# Patient Record
Sex: FEMALE | Race: OTHER
[De-identification: names, ages, dates, MRNs, and addresses within clinical notes are randomized per-mention and may not be internally consistent; named-entity substitution may affect disease eponyms.]

---

## 2017-03-06 ENCOUNTER — HOSPITAL ENCOUNTER (EMERGENCY)
Dept: HOSPITAL 72 - EMR | Age: 32
Discharge: HOME | End: 2017-03-06
Payer: SELF-PAY

## 2017-03-06 VITALS — HEIGHT: 57 IN | BODY MASS INDEX: 34.52 KG/M2 | WEIGHT: 160 LBS

## 2017-03-06 VITALS — DIASTOLIC BLOOD PRESSURE: 73 MMHG | SYSTOLIC BLOOD PRESSURE: 119 MMHG

## 2017-03-06 VITALS — SYSTOLIC BLOOD PRESSURE: 119 MMHG | DIASTOLIC BLOOD PRESSURE: 73 MMHG

## 2017-03-06 DIAGNOSIS — J20.9: Primary | ICD-10-CM

## 2017-03-06 DIAGNOSIS — Z91.048: ICD-10-CM

## 2017-03-06 PROCEDURE — 99284 EMERGENCY DEPT VISIT MOD MDM: CPT

## 2017-03-06 PROCEDURE — 94664 DEMO&/EVAL PT USE INHALER: CPT

## 2017-03-06 PROCEDURE — 94640 AIRWAY INHALATION TREATMENT: CPT

## 2017-03-27 ENCOUNTER — HOSPITAL ENCOUNTER (EMERGENCY)
Dept: HOSPITAL 72 - EMR | Age: 32
Discharge: HOME | End: 2017-03-27
Payer: SELF-PAY

## 2017-03-27 VITALS — SYSTOLIC BLOOD PRESSURE: 120 MMHG | DIASTOLIC BLOOD PRESSURE: 78 MMHG

## 2017-03-27 VITALS — DIASTOLIC BLOOD PRESSURE: 80 MMHG | SYSTOLIC BLOOD PRESSURE: 122 MMHG

## 2017-03-27 VITALS — HEIGHT: 59 IN | BODY MASS INDEX: 32.25 KG/M2 | WEIGHT: 160 LBS

## 2017-03-27 DIAGNOSIS — M25.561: Primary | ICD-10-CM

## 2017-03-27 PROCEDURE — 29530 STRAPPING OF KNEE: CPT

## 2017-03-27 PROCEDURE — 99283 EMERGENCY DEPT VISIT LOW MDM: CPT

## 2017-07-27 ENCOUNTER — HOSPITAL ENCOUNTER (EMERGENCY)
Dept: HOSPITAL 72 - EMR | Age: 32
Discharge: HOME | End: 2017-07-27
Payer: MEDICAID

## 2017-07-27 VITALS — HEIGHT: 59 IN | BODY MASS INDEX: 34.27 KG/M2 | WEIGHT: 170 LBS

## 2017-07-27 VITALS — SYSTOLIC BLOOD PRESSURE: 117 MMHG | DIASTOLIC BLOOD PRESSURE: 76 MMHG
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
## 2017-07-27 DIAGNOSIS — H02.846: ICD-10-CM

## 2017-07-27 DIAGNOSIS — H01.006: ICD-10-CM

## 2017-07-27 DIAGNOSIS — T78.40XA: Primary | ICD-10-CM

## 2017-07-27 PROCEDURE — 99282 EMERGENCY DEPT VISIT SF MDM: CPT

## 2018-06-10 ENCOUNTER — HOSPITAL ENCOUNTER (EMERGENCY)
Dept: HOSPITAL 72 - EMR | Age: 33
Discharge: HOME | End: 2018-06-10
Payer: MEDICAID

## 2018-06-10 VITALS — SYSTOLIC BLOOD PRESSURE: 121 MMHG | DIASTOLIC BLOOD PRESSURE: 66 MMHG

## 2018-06-10 VITALS — SYSTOLIC BLOOD PRESSURE: 96 MMHG | DIASTOLIC BLOOD PRESSURE: 63 MMHG

## 2018-06-10 VITALS — BODY MASS INDEX: 32.25 KG/M2 | WEIGHT: 160 LBS | HEIGHT: 59 IN
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
## 2018-06-10 DIAGNOSIS — S92.352A: Primary | ICD-10-CM
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
## 2018-06-10 PROCEDURE — 99283 EMERGENCY DEPT VISIT LOW MDM: CPT

## 2019-09-11 ENCOUNTER — HOSPITAL ENCOUNTER (EMERGENCY)
Dept: HOSPITAL 72 - EMR | Age: 34
Discharge: HOME | End: 2019-09-11
Payer: SELF-PAY

## 2019-09-11 VITALS — WEIGHT: 160 LBS | HEIGHT: 59 IN | BODY MASS INDEX: 32.25 KG/M2

## 2019-09-11 VITALS — DIASTOLIC BLOOD PRESSURE: 70 MMHG | SYSTOLIC BLOOD PRESSURE: 110 MMHG

## 2019-09-11 DIAGNOSIS — M25.562: Primary | ICD-10-CM

## 2019-09-11 PROCEDURE — 99282 EMERGENCY DEPT VISIT SF MDM: CPT

## 2020-07-18 ENCOUNTER — HOSPITAL ENCOUNTER (EMERGENCY)
Dept: HOSPITAL 72 - EMR | Age: 35
Discharge: HOME | End: 2020-07-18
Payer: MEDICAID

## 2020-07-18 VITALS — HEIGHT: 59 IN | WEIGHT: 155 LBS | BODY MASS INDEX: 31.25 KG/M2

## 2020-07-18 VITALS — SYSTOLIC BLOOD PRESSURE: 118 MMHG | DIASTOLIC BLOOD PRESSURE: 72 MMHG

## 2020-07-18 VITALS — DIASTOLIC BLOOD PRESSURE: 72 MMHG | SYSTOLIC BLOOD PRESSURE: 118 MMHG

## 2020-07-18 DIAGNOSIS — R50.9: Primary | ICD-10-CM

## 2020-07-18 PROCEDURE — 99281 EMR DPT VST MAYX REQ PHY/QHP: CPT

## 2020-07-18 NOTE — EMERGENCY ROOM REPORT
History of Present Illness


General


Chief Complaint:  Fever


Source:  Patient





Present Illness


HPI


Disclaimer: Please note that this report is being documented using [employer]AG[employer] 

technology. This can lead to erroneous entry secondary to incorrect 

interpretation by the dictating instrument.





HPI: 35-year-old otherwise healthy female presents for evaluation of fever.  

She presents with her 6-year-old son who has similar symptoms.  She reports low-

grade fevers over the past 3 to 5 days.  Denies cough, sore throat, nasal 

congestion, chest pain, palpitations, shortness of breath, nausea, vomiting, 

diarrhea.  Multiple people in the house have been tested for COVID-19 infection 

and thus far resulted negative.  Fevers responding well to Tylenol.  No other 

complaints at this time.





COVID-19 Screening


Contact w/high risk pt:  No


Experienced COVID-19 symptoms?:  Yes


COVID-19 Testing performed PTA:  Yes


COVID-19 Screening:  [facility] COVID-19


COVID-19 Testing Source:  [facility]





Patient History


Last Menstrual Period:  Unkown


Pregnant Now:  No





Review of Systems


All Other Systems:  negative except mentioned in HPI





Physical Exam





Vital Signs








  Date Time  Temp Pulse Resp B/P (MAP) Pulse Ox O2 Delivery O2 Flow Rate FiO2


 


7/18/20 11:23 99.3 88 20 118/72 (87) 95 Room Air  





 





General: Awake and alert, no acute distress, afebrile


HEENT: NC/AT. EOMI. PERRLA.  No icterus, no injection.  Uvula midline.  No 

pharyngeal edema or erythema.


Resp: Normal work of breathing.  No cough, no wheezing, no crackles.


Skin: Intact.  No abrasions, laceration or rash over the exposed skin


MSK: Normal tone and bulk. Moving all extremities.  No obvious deformity.


Neuro: Awake and alert.  Mentating appropriately





Medical Decision Making


Diagnostic Impression:  


 Primary Impression:  


 Febrile illness


 Additional Impression:  


 Suspected 2019 novel coronavirus infection


ER Course


Is a 35-year-old female presenting for evaluation of intermittent fevers over 

the past few days.  Patient awaiting the results of a outpatient COVID-19 test 

performed 3 days ago.  Has been using Tylenol at home with good effect and has 

no other complaints at this time.  No indication for emergent labs and imaging 

as she otherwise is well-appearing and physical exam is reassuring.  Will 

discharge and follow-up with PMD and await the results of the outpatient test 

however I instructed her to remain in isolation and quarantine as much as 

possible until those results are obtained.  She will follow-up with her PMD.  

Discussed reasons to return to the ED.  She understands and agrees with this 

treatment plan.





Last Vital Signs








  Date Time  Temp Pulse Resp B/P (MAP) Pulse Ox O2 Delivery O2 Flow Rate FiO2


 


7/18/20 11:23 99.3 88 20 118/72 (87) 95 Room Air  








Disposition:  HOME, SELF-CARE


Condition:  Stable











Tommie Monte MD Jul 18, 2020 11:35